# Patient Record
Sex: FEMALE | Race: ASIAN | NOT HISPANIC OR LATINO | Employment: STUDENT | URBAN - METROPOLITAN AREA
[De-identification: names, ages, dates, MRNs, and addresses within clinical notes are randomized per-mention and may not be internally consistent; named-entity substitution may affect disease eponyms.]

---
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
## 2021-08-05 ENCOUNTER — TELEPHONE (OUTPATIENT)
Dept: SCHEDULING | Facility: IMAGING CENTER | Age: 21
End: 2021-08-05

## 2025-05-20 ENCOUNTER — APPOINTMENT (OUTPATIENT)
Dept: RADIOLOGY | Facility: MEDICAL CENTER | Age: 25
End: 2025-05-20
Attending: STUDENT IN AN ORGANIZED HEALTH CARE EDUCATION/TRAINING PROGRAM
Payer: COMMERCIAL

## 2025-05-20 ENCOUNTER — HOSPITAL ENCOUNTER (EMERGENCY)
Facility: MEDICAL CENTER | Age: 25
End: 2025-05-20
Attending: STUDENT IN AN ORGANIZED HEALTH CARE EDUCATION/TRAINING PROGRAM
Payer: COMMERCIAL

## 2025-05-20 VITALS
HEIGHT: 65 IN | TEMPERATURE: 98.1 F | WEIGHT: 200 LBS | SYSTOLIC BLOOD PRESSURE: 151 MMHG | BODY MASS INDEX: 33.32 KG/M2 | HEART RATE: 98 BPM | OXYGEN SATURATION: 97 % | DIASTOLIC BLOOD PRESSURE: 87 MMHG | RESPIRATION RATE: 20 BRPM
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
## 2025-05-20 DIAGNOSIS — M79.602 BILATERAL ARM PAIN: ICD-10-CM

## 2025-05-20 DIAGNOSIS — M79.601 BILATERAL ARM PAIN: ICD-10-CM

## 2025-05-20 PROCEDURE — A9270 NON-COVERED ITEM OR SERVICE: HCPCS | Performed by: STUDENT IN AN ORGANIZED HEALTH CARE EDUCATION/TRAINING PROGRAM

## 2025-05-20 PROCEDURE — 73070 X-RAY EXAM OF ELBOW: CPT | Mod: RT

## 2025-05-20 PROCEDURE — 73070 X-RAY EXAM OF ELBOW: CPT | Mod: LT

## 2025-05-20 PROCEDURE — 307740 HCHG GREEN TRAUMA TEAM SERVICES

## 2025-05-20 PROCEDURE — 700102 HCHG RX REV CODE 250 W/ 637 OVERRIDE(OP): Performed by: STUDENT IN AN ORGANIZED HEALTH CARE EDUCATION/TRAINING PROGRAM

## 2025-05-20 PROCEDURE — 99285 EMERGENCY DEPT VISIT HI MDM: CPT

## 2025-05-20 PROCEDURE — 71045 X-RAY EXAM CHEST 1 VIEW: CPT

## 2025-05-20 RX ORDER — METHOCARBAMOL 750 MG/1
750 TABLET, FILM COATED ORAL 4 TIMES DAILY
Qty: 120 TABLET | Refills: 0 | Status: SHIPPED | OUTPATIENT
Start: 2025-05-20

## 2025-05-20 RX ORDER — IBUPROFEN 600 MG/1
600 TABLET, FILM COATED ORAL ONCE
Status: COMPLETED | OUTPATIENT
Start: 2025-05-20 | End: 2025-05-20

## 2025-05-20 RX ORDER — ACETAMINOPHEN 325 MG/1
650 TABLET ORAL ONCE
Status: COMPLETED | OUTPATIENT
Start: 2025-05-20 | End: 2025-05-20

## 2025-05-20 RX ORDER — IBUPROFEN 800 MG/1
800 TABLET, FILM COATED ORAL EVERY 8 HOURS PRN
Qty: 30 TABLET | Refills: 0 | Status: SHIPPED | OUTPATIENT
Start: 2025-05-20

## 2025-05-20 RX ADMIN — IBUPROFEN 600 MG: 600 TABLET ORAL at 19:35

## 2025-05-20 RX ADMIN — ACETAMINOPHEN 650 MG: 325 TABLET ORAL at 19:35

## 2025-05-20 NOTE — Clinical Note
Bhavik Finch was seen and treated in our emergency department on 5/20/2025.  She may return to work on 05/23/2025.       If you have any questions or concerns, please don't hesitate to call.      Vielka Whipple M.D.
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
## 2025-05-21 NOTE — ED NOTES
Pt signed and given d/c papers after discussion w/ ERP regarding negative/clear results. Discussed OTC for pain and highlighted ERP instructions and reviewed x2 rx sent to pref pharmacy. Return for worsening s/s. Work note provided per ERP. Pt denies further questions/concerns. Pt ambulated w/ steady gait, A&O x4 w/ all belongings to lobby exit, stable and cleared for d/c.
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
